# Patient Record
Sex: FEMALE | Race: WHITE | NOT HISPANIC OR LATINO | Employment: UNEMPLOYED | URBAN - METROPOLITAN AREA
[De-identification: names, ages, dates, MRNs, and addresses within clinical notes are randomized per-mention and may not be internally consistent; named-entity substitution may affect disease eponyms.]

---

## 2019-08-29 ENCOUNTER — HOSPITAL ENCOUNTER (EMERGENCY)
Facility: MEDICAL CENTER | Age: 19
End: 2019-08-30
Attending: EMERGENCY MEDICINE
Payer: COMMERCIAL

## 2019-08-29 DIAGNOSIS — Z51.89 ENCOUNTER FOR WOUND RE-CHECK: ICD-10-CM

## 2019-08-29 DIAGNOSIS — T81.30XA WOUND DEHISCENCE: ICD-10-CM

## 2019-08-29 DIAGNOSIS — T14.8XXA WOUND INFECTION: ICD-10-CM

## 2019-08-29 DIAGNOSIS — L08.9 WOUND INFECTION: ICD-10-CM

## 2019-08-29 DIAGNOSIS — L03.114 CELLULITIS OF LEFT UPPER EXTREMITY: ICD-10-CM

## 2019-08-29 PROCEDURE — 99284 EMERGENCY DEPT VISIT MOD MDM: CPT

## 2019-08-29 SDOH — HEALTH STABILITY: MENTAL HEALTH: HOW OFTEN DO YOU HAVE A DRINK CONTAINING ALCOHOL?: 2-3 TIMES A WEEK

## 2019-08-30 VITALS
SYSTOLIC BLOOD PRESSURE: 118 MMHG | HEIGHT: 67 IN | WEIGHT: 154.98 LBS | HEART RATE: 79 BPM | RESPIRATION RATE: 18 BRPM | OXYGEN SATURATION: 98 % | TEMPERATURE: 97.5 F | BODY MASS INDEX: 24.33 KG/M2 | DIASTOLIC BLOOD PRESSURE: 79 MMHG

## 2019-08-30 LAB
ANION GAP SERPL CALC-SCNC: 13 MMOL/L (ref 0–11.9)
BASOPHILS # BLD AUTO: 0.5 % (ref 0–1.8)
BASOPHILS # BLD: 0.05 K/UL (ref 0–0.12)
BUN SERPL-MCNC: 17 MG/DL (ref 8–22)
CALCIUM SERPL-MCNC: 9.5 MG/DL (ref 8.5–10.5)
CHLORIDE SERPL-SCNC: 101 MMOL/L (ref 96–112)
CO2 SERPL-SCNC: 22 MMOL/L (ref 20–33)
CREAT SERPL-MCNC: 0.72 MG/DL (ref 0.5–1.4)
CRP SERPL HS-MCNC: 1.8 MG/DL (ref 0–0.75)
EOSINOPHIL # BLD AUTO: 0.05 K/UL (ref 0–0.51)
EOSINOPHIL NFR BLD: 0.5 % (ref 0–6.9)
ERYTHROCYTE [DISTWIDTH] IN BLOOD BY AUTOMATED COUNT: 42.9 FL (ref 35.9–50)
GLUCOSE SERPL-MCNC: 76 MG/DL (ref 65–99)
HCT VFR BLD AUTO: 39.3 % (ref 37–47)
HGB BLD-MCNC: 12.8 G/DL (ref 12–16)
IMM GRANULOCYTES # BLD AUTO: 0.04 K/UL (ref 0–0.11)
IMM GRANULOCYTES NFR BLD AUTO: 0.4 % (ref 0–0.9)
LYMPHOCYTES # BLD AUTO: 2.18 K/UL (ref 1–4.8)
LYMPHOCYTES NFR BLD: 19.7 % (ref 22–41)
MCH RBC QN AUTO: 30.3 PG (ref 27–33)
MCHC RBC AUTO-ENTMCNC: 32.6 G/DL (ref 33.6–35)
MCV RBC AUTO: 92.9 FL (ref 81.4–97.8)
MONOCYTES # BLD AUTO: 1.09 K/UL (ref 0–0.85)
MONOCYTES NFR BLD AUTO: 9.8 % (ref 0–13.4)
NEUTROPHILS # BLD AUTO: 7.66 K/UL (ref 2–7.15)
NEUTROPHILS NFR BLD: 69.1 % (ref 44–72)
NRBC # BLD AUTO: 0 K/UL
NRBC BLD-RTO: 0 /100 WBC
PLATELET # BLD AUTO: 186 K/UL (ref 164–446)
PMV BLD AUTO: 10.6 FL (ref 9–12.9)
POTASSIUM SERPL-SCNC: 3.7 MMOL/L (ref 3.6–5.5)
RBC # BLD AUTO: 4.23 M/UL (ref 4.2–5.4)
SODIUM SERPL-SCNC: 136 MMOL/L (ref 135–145)
WBC # BLD AUTO: 11.1 K/UL (ref 4.8–10.8)

## 2019-08-30 PROCEDURE — 80048 BASIC METABOLIC PNL TOTAL CA: CPT

## 2019-08-30 PROCEDURE — A9270 NON-COVERED ITEM OR SERVICE: HCPCS | Performed by: EMERGENCY MEDICINE

## 2019-08-30 PROCEDURE — 700102 HCHG RX REV CODE 250 W/ 637 OVERRIDE(OP): Performed by: EMERGENCY MEDICINE

## 2019-08-30 PROCEDURE — 86140 C-REACTIVE PROTEIN: CPT

## 2019-08-30 PROCEDURE — 85025 COMPLETE CBC W/AUTO DIFF WBC: CPT

## 2019-08-30 RX ORDER — CLINDAMYCIN HYDROCHLORIDE 150 MG/1
300 CAPSULE ORAL ONCE
Status: COMPLETED | OUTPATIENT
Start: 2019-08-30 | End: 2019-08-30

## 2019-08-30 RX ORDER — CLINDAMYCIN HYDROCHLORIDE 300 MG/1
300 CAPSULE ORAL 4 TIMES DAILY
Qty: 28 CAP | Refills: 0 | Status: SHIPPED | OUTPATIENT
Start: 2019-08-30 | End: 2019-09-06

## 2019-08-30 RX ADMIN — CLINDAMYCIN HYDROCHLORIDE 300 MG: 150 CAPSULE ORAL at 01:11

## 2019-08-30 NOTE — ED NOTES
Discharge instructions gone over with pt. Pt verbalized understanding. Pt educated on prescription given. Pt educated on s/sx to return to ED. Pt left ambulatory with steady gait home.

## 2019-08-30 NOTE — ED TRIAGE NOTES
"Chief Complaint   Patient presents with   • Wound Check     left forearm      /84   Pulse 91   Temp 36.4 °C (97.5 °F) (Temporal)   Resp 16   Ht 1.702 m (5' 7\")   Wt 70.3 kg (154 lb 15.7 oz)   SpO2 100%   BMI 24.27 kg/m²     Pt has stiches to her left forearm that she states has swollen and has increased in pain. Slight redness is noted around the wound, and is hard to the touch. States that there has been scant drainage from the wound.  She states that she had the stiches done in [place] and doesn't remember how she got the wound.  She was taking bactrim.     Denies fever/chills, and n/v.     Pt back out to lobby.   "

## 2019-08-30 NOTE — ED PROVIDER NOTES
ED Provider Note    Scribed for Nathan Yeung M.D. by Doyle King. 8/30/2019  12:03 AM    CHIEF COMPLAINT  Chief Complaint   Patient presents with   • Wound Check     left forearm        HPI  Loretta Giles is a 19 y.o. female who presents for a wound check over her left forearm onset today. Patient reports that she had stitches put in over her left forearm at an Urgent Care in [place], [place] eight days ago. She does not remember how she obtained the wound. She was instructed to have the stitches taken out 7-10 days later. The patient states that, starting 5 days ago, the stitched area started to have some swelling, numbness, and tingling. She notes that the swelling started small, but it rapidly worsened. Patient started Bactrim two days ago, but her symptoms have not alleviated any. The patient reports that the swelling and redness worsened significantly over the past two or three hours, which is why she presents to the [facility] ED at this time. She denies any recent fevers.    REVIEW OF SYSTEMS  Constitutional: No fevers  Skin: Positive for redness of the left forearm.   MSK: Positive for left forearm swelling.  Neuro: Positive for numbness and tingling of the left forearm.   See HPI for further details. All other systems are negative.     PAST MEDICAL HISTORY    No hypertension    SOCIAL HISTORY  Social History     Tobacco Use   • Smoking status: Never Smoker   Substance and Sexual Activity   • Alcohol use: Yes     Frequency: 2-3 times a week   • Drug use: Yes     Types: Inhaled     Comment: Marijuana occ   • Sexual activity: None noted       SURGICAL HISTORY  patient denies any surgical history    CURRENT MEDICATIONS  MEDICATIONS GIVEN IN THE E.D.  Medications   tetanus-dipth-acell pertussis (ADACEL) inj 0.5 mL (0.5 mL Intramuscular Refused 8/30/19 0045)   clindamycin (CLEOCIN) capsule 300 mg (300 mg Oral Given 8/30/19 0111)       ALLERGIES  No  "Known Allergies    PHYSICAL EXAM  VITAL SIGNS: /84   Pulse 91   Temp 36.4 °C (97.5 °F) (Temporal)   Resp 16   Ht 1.702 m (5' 7\")   Wt 70.3 kg (154 lb 15.7 oz)   SpO2 100%   BMI 24.27 kg/m²  @ESTHER[[id number]::@   Pulse ox interpretation: I interpret this pulse ox as normal.  Genl: F laying in bed comfortably, speaking clearly, appears anxious in no acute distress   Head: NC/AT   ENT: Mucous membranes moist, posterior pharynx clear, uvula midline, nares patent bilaterally  Eyes: Normal sclera, pupils equal round reactive to light  Neck: Supple, FROM, no LAD appreciated  Pulmonary: Lungs are clear to auscultation bilaterally  Chest: No TTP  CV: Borderline tachycardia. Regular rhythm, no murmur appreciated, pulses 2+ in both upper and lower extremities,  Abdomen: soft, NT/ND; no rebound/guarding, no masses palpated, no HSM   : no CVA or suprapubic tenderness   Musculoskeletal: Pain free ROM of the neck. Moving upper and lower extremities and spontaneous in coordinated fashion    Left Upper Extremity:  - Non-tender with palpation of the left shoulder and left elbow  - Skin: 3 cm erythematous wound site with sutures in place with induration. Sutures were removed on site: Superficial purulence is noted that extends only to the dermis. No neurovascular compromise. Regional erythema extends to antecubital fossa without abscess.  - Motor: Full ROM at shoulder, elbow, wrist; 5/5 wrist flexion/extension, thumb IP joint flexion/extension (AIN/PIN), abduction/adduction (ulnar)  - Sensation intact to median/ulnar/radial nerves  - 2+ radial pulse, < 2 sec cap refill x 5 digits    Neuro: A&Ox4 (person, place, time, situation), speech fluent, gait steady, no focal deficits appreciated, No cerebellar signs  Sensation is grossly intact in the distal upper and lower extremities  5/5 strength in  and dorsiflexion/plantar flexion of the ankles  Psych: Patient has an appropriate affect and behavior  Skin: No rash or lesions. "  No pallor or jaundice.  No cyanosis.  Warm and dry.     DIAGNOSTIC STUDIES / PROCEDURES    LABS  Results for orders placed or performed during the hospital encounter of 08/29/19   CBC WITH DIFFERENTIAL   Result Value Ref Range    WBC 11.1 (H) 4.8 - 10.8 K/uL    RBC 4.23 4.20 - 5.40 M/uL    Hemoglobin 12.8 12.0 - 16.0 g/dL    Hematocrit 39.3 37.0 - 47.0 %    MCV 92.9 81.4 - 97.8 fL    MCH 30.3 27.0 - 33.0 pg    MCHC 32.6 (L) 33.6 - 35.0 g/dL    RDW 42.9 35.9 - 50.0 fL    Platelet Count 186 164 - 446 K/uL    MPV 10.6 9.0 - 12.9 fL    Neutrophils-Polys 69.10 44.00 - 72.00 %    Lymphocytes 19.70 (L) 22.00 - 41.00 %    Monocytes 9.80 0.00 - 13.40 %    Eosinophils 0.50 0.00 - 6.90 %    Basophils 0.50 0.00 - 1.80 %    Immature Granulocytes 0.40 0.00 - 0.90 %    Nucleated RBC 0.00 /100 WBC    Neutrophils (Absolute) 7.66 (H) 2.00 - 7.15 K/uL    Lymphs (Absolute) 2.18 1.00 - 4.80 K/uL    Monos (Absolute) 1.09 (H) 0.00 - 0.85 K/uL    Eos (Absolute) 0.05 0.00 - 0.51 K/uL    Baso (Absolute) 0.05 0.00 - 0.12 K/uL    Immature Granulocytes (abs) 0.04 0.00 - 0.11 K/uL    NRBC (Absolute) 0.00 K/uL   Basic Metabolic Panel   Result Value Ref Range    Sodium 136 135 - 145 mmol/L    Potassium 3.7 3.6 - 5.5 mmol/L    Chloride 101 96 - 112 mmol/L    Co2 22 20 - 33 mmol/L    Glucose 76 65 - 99 mg/dL    Bun 17 8 - 22 mg/dL    Creatinine 0.72 0.50 - 1.40 mg/dL    Calcium 9.5 8.5 - 10.5 mg/dL    Anion Gap 13.0 (H) 0.0 - 11.9   CRP QUANTITIVE (NON-CARDIAC)   Result Value Ref Range    Stat C-Reactive Protein 1.80 (H) 0.00 - 0.75 mg/dL   ESTIMATED GFR   Result Value Ref Range    GFR If African American >60 >60 mL/min/1.73 m 2    GFR If Non African American >60 >60 mL/min/1.73 m 2       COURSE & MEDICAL DECISION MAKING  Pertinent Labs & Imaging studies reviewed. (See chart for details)    Differential diagnoses include but not limited to: cellulitis, abscess, wound complications, dehiscence, and wound contamination.     12:03 AM - Patient seen  "and examined at bedside. Discussed with the patient that I will remove her stitches at this time. She understands the reasons for this. Superficial purulence is noted that extends only to the dermis. No neurovascular compromise. Regional erythema extends to antecubital fossa without abscess. I have conveyed to her the possible reasons that her wound is erythematous and swelling. She has been instructed on proper wound care and the medications to take in order to facilitate healing of her wound. The patient understands the importance of being diligent in being careful over the next few weeks.    12:10 PM - Further discussion with the patient at this time. She wishes to undergo some basic blood work in order to further evaluate. Patient will be treated with Adacel 0.5 ml. Ordered CBC w/ Diff, BMP, and CRP Quant (Non-Cardiac) to evaluate her symptoms.    12:41 AM - Treated patient with Cleocin 300 mg.      Medical Decision Making:   Patient presents emergency room for the symptoms as described above.  She had a remote history laceration that required suturing within the last week.  In the last several days she developed some redness and discomfort.  She was recently prescribed antibiotics of which she has been somewhat adherent to but has not taken them during times when she has been \"partying.\"  In this interim time she has developed some redness and discomfort at the area.  Due to the signs of superficial cellulitis in the area of the sutures are removed and there is very minor amount of purulence noted at the wound margin.  The underlying wound bed has granulation tissue and there is no other sources of abscess.  Her vital signs are reassuring with normal heart rate and the patient is not from here, and does not wish to be hospitalized.  She has not failed a course of antibiotics that she is only been on these for less than 2 days duration.  With her minimal compliance I recommended that she stop drinking, take the " antibiotics as directed and have a repeat wound check in 48 hours.  During the initial work-up of a straight stick is been obtained and blood work ordered.  She had very slight leukocytosis of 11.4, no leftward shift, no gross metabolic derangement, CRP is slightly elevated at 1.4.  These are all characteristic of infectious etiology.  She does not have sepsis, there is no fascial fluid, no rapid progression, low likelihood of necrotizing fasciitis or other rapidly progressive skin infection.  Patient is wound care as discussed, she will be placed on clindamycin and advised to stop taking just Bactrim, she will need a recheck in 48 hours or prompt reassessment for IV antibiotics if she has interval worsening in the next 24 hours.  Questions are addressed and the patient is discharged home..    The patient will not drink alcohol nor drive with prescribed medications. The patient will return for worsening symptoms and is stable at the time of discharge. The patient verbalizes understanding and will comply.     FINAL IMPRESSION  Visit Diagnoses     ICD-10-CM   1. Encounter for wound re-check Z51.89   2. Wound infection T14.8XXA    L08.9   3. Wound dehiscence T81.30XA   4. Cellulitis of left upper extremity L03.114        Dyole JARAMILLO ([place]), am scribing for, and in the presence of, Nathan Yeung M.D..    Electronically signed by: Doyle King ([place]), 8/30/2019    Nathan JARAMILLO M.D. personally performed the services described in this documentation, as scribed by Doyle King in my presence, and it is both accurate and complete.    C  The note accurately reflects work and decisions made by me.  Nathan Yeung  8/30/2019  1:50 AM

## 2024-02-20 ENCOUNTER — APPOINTMENT (RX ONLY)
Dept: URBAN - METROPOLITAN AREA CLINIC 31 | Facility: CLINIC | Age: 24
Setting detail: DERMATOLOGY
End: 2024-02-20

## 2024-02-20 DIAGNOSIS — B07.0 PLANTAR WART: ICD-10-CM

## 2024-02-20 DIAGNOSIS — L30.8 OTHER SPECIFIED DERMATITIS: ICD-10-CM

## 2024-02-20 PROCEDURE — 17110 DESTRUCTION B9 LES UP TO 14: CPT

## 2024-02-20 PROCEDURE — ? COUNSELING

## 2024-02-20 PROCEDURE — ? PRESCRIPTION

## 2024-02-20 PROCEDURE — ? LIQUID NITROGEN

## 2024-02-20 PROCEDURE — 99203 OFFICE O/P NEW LOW 30 MIN: CPT | Mod: 25

## 2024-02-20 PROCEDURE — ? PRESCRIPTION MEDICATION MANAGEMENT

## 2024-02-20 RX ORDER — IMIQUIMOD 12.5 MG/.25G
CREAM TOPICAL
Qty: 24 | Refills: 0 | Status: ERX | COMMUNITY
Start: 2024-02-20

## 2024-02-20 RX ADMIN — IMIQUIMOD: 12.5 CREAM TOPICAL at 00:00

## 2024-02-20 ASSESSMENT — SEVERITY ASSESSMENT: SEVERITY: MILD

## 2024-02-20 ASSESSMENT — LOCATION DETAILED DESCRIPTION DERM
LOCATION DETAILED: LEFT LATERAL PLANTAR 1ST TOE
LOCATION DETAILED: RIGHT LATERAL PLANTAR MIDFOOT
LOCATION DETAILED: RIGHT PLANTAR FOREFOOT OVERLYING 1ST METATARSAL
LOCATION DETAILED: LEFT PLANTAR FOREFOOT OVERLYING 3RD METATARSAL
LOCATION DETAILED: LEFT PLANTAR FOREFOOT OVERLYING 1ST METATARSAL

## 2024-02-20 ASSESSMENT — LOCATION SIMPLE DESCRIPTION DERM
LOCATION SIMPLE: RIGHT PLANTAR SURFACE
LOCATION SIMPLE: LEFT PLANTAR SURFACE
LOCATION SIMPLE: PLANTAR SURFACE OF LEFT 1ST TOE

## 2024-02-20 ASSESSMENT — LOCATION ZONE DERM
LOCATION ZONE: TOE
LOCATION ZONE: FEET

## 2024-02-20 NOTE — PROCEDURE: LIQUID NITROGEN
Duration Of Freeze Thaw-Cycle (Seconds): 3
Show Applicator Variable?: Yes
Spray Paint Text: The liquid nitrogen was applied to the skin utilizing a spray paint frosting technique.
Add 52 Modifier (Optional): no
Consent: The patient's verbal consent was obtained including but not limited to risks of crusting, scabbing, blistering, scarring, darker or lighter pigmentary change, recurrence, incomplete removal and infection.
Application Tool (Optional): Cry-AC
Medical Necessity Information: It is in your best interest to select a reason for this procedure from the list below. All of these items fulfill various CMS LCD requirements except the new and changing color options.
Number Of Freeze-Thaw Cycles: 2 freeze-thaw cycles
Medical Necessity Clause: This procedure was medically necessary because the lesions that were treated were:
Detail Level: Detailed
Post-Care Instructions: I reviewed with the patient in detail post-care instructions. Patient is to wear sunprotection, and avoid picking at any of the treated lesions. Pt may apply Vaseline to crusted or scabbing areas.

## 2024-02-20 NOTE — HPI: WARTS (VERRUCA)
How Severe Are Your Warts?: moderate
Is This A New Presentation, Or A Follow-Up?: Warts
Treatment Number (Optional): 3
Additional History: Pt has been seeing Sierra [facility]wood at [facility] and has gone through 2 treatments of LN2 before referral to see us. Last LN2 treatment was last week and prior treatment was about 4 weeks ago.

## 2024-02-20 NOTE — PROCEDURE: PRESCRIPTION MEDICATION MANAGEMENT
Render In Strict Bullet Format?: No
Plan: Start: imiquimod 5 % topical cream packet \\nsig: Apply to wart once daily at night.\\nStart: OTC Zinc supplement
Detail Level: Zone

## 2024-09-26 RX ORDER — IMIQUIMOD 12.5 MG/.25G
CREAM TOPICAL
Qty: 24 | Refills: 0 | Status: ERX